# Patient Record
Sex: FEMALE | Race: WHITE | NOT HISPANIC OR LATINO | Employment: STUDENT | URBAN - METROPOLITAN AREA
[De-identification: names, ages, dates, MRNs, and addresses within clinical notes are randomized per-mention and may not be internally consistent; named-entity substitution may affect disease eponyms.]

---

## 2023-11-21 ENCOUNTER — OFFICE VISIT (OUTPATIENT)
Dept: PRIMARY CARE | Facility: CLINIC | Age: 18
End: 2023-11-21
Payer: COMMERCIAL

## 2023-11-21 VITALS
SYSTOLIC BLOOD PRESSURE: 122 MMHG | TEMPERATURE: 98 F | OXYGEN SATURATION: 98 % | BODY MASS INDEX: 36.88 KG/M2 | WEIGHT: 216 LBS | DIASTOLIC BLOOD PRESSURE: 82 MMHG | HEIGHT: 64 IN | HEART RATE: 101 BPM

## 2023-11-21 DIAGNOSIS — K64.9 ACUTE HEMORRHOID: ICD-10-CM

## 2023-11-21 DIAGNOSIS — R11.14 BILIOUS VOMITING WITH NAUSEA: Primary | ICD-10-CM

## 2023-11-21 DIAGNOSIS — E66.9 OBESITY (BMI 30-39.9): ICD-10-CM

## 2023-11-21 DIAGNOSIS — R11.10 CHRONIC VOMITING: ICD-10-CM

## 2023-11-21 PROCEDURE — 99215 OFFICE O/P EST HI 40 MIN: CPT | Performed by: FAMILY MEDICINE

## 2023-11-21 PROCEDURE — 1036F TOBACCO NON-USER: CPT | Performed by: FAMILY MEDICINE

## 2023-11-21 RX ORDER — SUMATRIPTAN SUCCINATE 100 MG/1
100 TABLET ORAL AS NEEDED
COMMUNITY
Start: 2022-12-13 | End: 2023-11-21 | Stop reason: WASHOUT

## 2023-11-21 RX ORDER — METFORMIN HYDROCHLORIDE 500 MG/1
1 TABLET ORAL
COMMUNITY
Start: 2023-03-30 | End: 2023-11-21 | Stop reason: WASHOUT

## 2023-11-21 RX ORDER — ARIPIPRAZOLE 5 MG/1
5 TABLET ORAL DAILY
COMMUNITY
End: 2023-11-21 | Stop reason: WASHOUT

## 2023-11-21 RX ORDER — SERTRALINE HYDROCHLORIDE 100 MG/1
100 TABLET, FILM COATED ORAL 2 TIMES DAILY
COMMUNITY
End: 2023-11-21 | Stop reason: WASHOUT

## 2023-11-21 RX ORDER — PROPRANOLOL HYDROCHLORIDE 10 MG/1
TABLET ORAL
COMMUNITY
Start: 2023-03-30 | End: 2023-11-21 | Stop reason: WASHOUT

## 2023-11-21 RX ORDER — METOCLOPRAMIDE 5 MG/1
5 TABLET ORAL 3 TIMES DAILY
Qty: 90 TABLET | Refills: 0 | Status: SHIPPED | OUTPATIENT
Start: 2023-11-21 | End: 2023-12-04 | Stop reason: WASHOUT

## 2023-11-21 RX ORDER — ONDANSETRON HYDROCHLORIDE 8 MG/1
8 TABLET, FILM COATED ORAL DAILY PRN
COMMUNITY
End: 2023-11-21 | Stop reason: WASHOUT

## 2023-11-21 ASSESSMENT — PATIENT HEALTH QUESTIONNAIRE - PHQ9
2. FEELING DOWN, DEPRESSED OR HOPELESS: SEVERAL DAYS
SUM OF ALL RESPONSES TO PHQ9 QUESTIONS 1 AND 2: 2
1. LITTLE INTEREST OR PLEASURE IN DOING THINGS: SEVERAL DAYS
10. IF YOU CHECKED OFF ANY PROBLEMS, HOW DIFFICULT HAVE THESE PROBLEMS MADE IT FOR YOU TO DO YOUR WORK, TAKE CARE OF THINGS AT HOME, OR GET ALONG WITH OTHER PEOPLE: SOMEWHAT DIFFICULT

## 2023-11-21 ASSESSMENT — PAIN SCALES - GENERAL: PAINLEVEL: 0-NO PAIN

## 2023-11-21 NOTE — PROGRESS NOTES
"Patient is here with mom to address some nausea and vomiting.  They have been frustrated with a lot of their care and the GI doctor that they saw simply brushed it off as having anxiety.  Patient even before having anxiety depression seen his psychiatrist was having some occasional vomiting as it was.  Patient for the last 4 months has been following up every other week with a counselor as well as also following up with her psychiatrist.  Last 4 months she is actually come off all of her psychiatric medications because they were not helping her at all and she still \"wanted to kill herself \"patient also does become very tearful during the examination but is consolable.  Patient is using medical marijuana daily and has not noticed any change in her nausea and vomiting though she states that before the medical marijuana she did actually still have some occasional nauseousness.  She has no actual abdominal pain or discomfort.  Her bowels are normal though sometimes she will be constipated.  Patient also sometimes will have some mild burning after eating spicy food but does not have nausea and vomiting in relationship specifically to food.  She also had to take a semester off because she was vomiting 3 or 4 times during class and also had to step away from her job because of it as well.  Is been very stressful for the patient as well as her mom is trying to help her.    REVIEW OF SYSTEMS: 12 systems negative except for those mentioned in the HPI.    PHYSICAL EXAMINATION:   Constitutional: The patient is alert, in no acute  distress.  Eyes: Extraocular movements are intact. Pupils are equal and reactive to light  ENT: TMs are clear  Neck: Supple without lymphadenopathy or JVD.  Heart: Regular rate and rhythm without murmur, click, gallop, or rub.  Lungs: Clear to auscultation bilaterally. No rales, rhonchi, or  wheezing.  Psychiatric: Good judgment and insight.  Patient does become tearful and somewhat avoidant however we " are able to get through this and patient does calm down and has excellent judgment and insight as well as good communication ability to share her thoughts  Abdomen: Soft nontender nondistended normoactive bowel sounds no palpable masses  Lymphatic: as noted above.  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  She has no other overt signs or symptoms of anything to be concerning with an ulcer we will go ahead and check her for H. pylori rule out.  Also discussed she has no other signs and symptoms or consistency associated with food which would be consistent with acid reflux discussed that the opioid receptor has a huge play.  The patient actually had denied using the edibles and states that she only smokes however when looking at her OARRS report she got over 220 edibles that were dispensed on the seventh of this month.  Patient will have some Reglan to see if this would help overcome her symptoms and I did refer her to a different GI at this point she does need a scope to rule that out.  They are also understandably very stressed because regular medicine has not necessarily helped with her anxiety depression and this has been very frustrating for the patient because the marijuana actually seems to help calm her down however I discussed with the patient that obviously even with the marijuana she is not having any quality of life since she has not actually able to be able to work and or even go to school.  I will also consider a [employer]or[employer] test for specific metabolic analysis to help further choose more specific medications and can also discuss this with her psychiatrist.  I discussed with her going off marijuana if she chooses to do that it would still take 2 to 3 weeks to actually have effect on her bowels.  We will go ahead  and run some blood work start her on Reglan refer back to the psychiatrist as well as also to a different GI doctor and consider scope as well as also gastric emptying test however that could be skewed simply by the fact that she is taking a massive amount of marijuana edibles.  Patient also needed records of immunizations for school and we do have some of those left go back through the chart.  Substantial amount of time counseling the patient discussing these things we will follow-up in 2 to 3 weeks.  Patient with multi complexIssues especially considering that she is nonsuicidal on the marijuana and has not really had other relief with the other medication but not sure if she was taking both of those during that timeframe as she did start those medications back in July.  Patient also has neuropsychiatry notes history of binge eating with nausea vomiting.  Also could consider Adipex usage to help with medical weight loss as well as the mood but would likely need to come off the edible Gummies to be able to do so she could still continue to smoke  This dictation was created using Dragon dictation and may contain errors

## 2023-11-21 NOTE — PROGRESS NOTES
Vomiting , Fatigue - x 6 months   Vomiting about 4x per week now   Has seen GI - they dismissed her and said it was prob anxiety

## 2023-11-22 ENCOUNTER — LAB (OUTPATIENT)
Dept: LAB | Facility: LAB | Age: 18
End: 2023-11-22
Payer: COMMERCIAL

## 2023-11-22 DIAGNOSIS — R11.14 BILIOUS VOMITING WITH NAUSEA: ICD-10-CM

## 2023-11-22 LAB
GLIADIN PEPTIDE IGA SER IA-ACNC: <1 U/ML
GLIADIN PEPTIDE IGG SER IA-ACNC: <1 U/ML
TTG IGA SER IA-ACNC: <1 U/ML
TTG IGG SER IA-ACNC: <1 U/ML

## 2023-11-22 PROCEDURE — 86258 DGP ANTIBODY EACH IG CLASS: CPT

## 2023-11-22 PROCEDURE — 83013 H PYLORI (C-13) BREATH: CPT

## 2023-11-22 PROCEDURE — 86003 ALLG SPEC IGE CRUDE XTRC EA: CPT

## 2023-11-22 PROCEDURE — 36415 COLL VENOUS BLD VENIPUNCTURE: CPT

## 2023-11-22 PROCEDURE — 86364 TISS TRNSGLTMNASE EA IG CLAS: CPT

## 2023-11-23 LAB — UREA BREATH TEST QL: NEGATIVE

## 2023-11-24 LAB
CLAM IGE QN: <0.1 KU/L
CODFISH IGE QN: <0.1 KU/L
CORN IGE QN: <0.1
EGG WHITE IGE QN: <0.1 KU/L
MILK IGE QN: <0.1 KU/L
PEANUT IGE QN: <0.1 KU/L
SCALLOP IGE QN: <0.1 KU/L
SESAME SEED IGE QN: <0.1 KU/L
SHRIMP IGE QN: <0.1 KU/L
SOYBEAN IGE QN: <0.1 KU/L
WALNUT IGE QN: <0.1 KU/L
WHEAT IGE QN: <0.1 KU/L

## 2023-11-28 ENCOUNTER — TELEPHONE (OUTPATIENT)
Dept: PRIMARY CARE | Facility: CLINIC | Age: 18
End: 2023-11-28
Payer: COMMERCIAL

## 2023-11-28 NOTE — TELEPHONE ENCOUNTER
----- Message from Zeb Rosales DO sent at 11/27/2023  8:20 AM EST -----  Patient's testing for H. pylori, celiac, and food allergy panel was all completely negative.  This might relate to a more functional bowel issue such as needing prebiotic and probiotic.

## 2023-12-04 ENCOUNTER — APPOINTMENT (OUTPATIENT)
Dept: PRIMARY CARE | Facility: CLINIC | Age: 18
End: 2023-12-04
Payer: COMMERCIAL

## 2023-12-04 ENCOUNTER — OFFICE VISIT (OUTPATIENT)
Dept: PRIMARY CARE | Facility: CLINIC | Age: 18
End: 2023-12-04
Payer: COMMERCIAL

## 2023-12-04 VITALS
SYSTOLIC BLOOD PRESSURE: 154 MMHG | HEART RATE: 97 BPM | BODY MASS INDEX: 36.88 KG/M2 | DIASTOLIC BLOOD PRESSURE: 90 MMHG | OXYGEN SATURATION: 95 % | HEIGHT: 64 IN | WEIGHT: 216 LBS | TEMPERATURE: 98 F

## 2023-12-04 DIAGNOSIS — R11.10 CHRONIC VOMITING: Primary | ICD-10-CM

## 2023-12-04 DIAGNOSIS — R11.14 BILIOUS VOMITING WITH NAUSEA: ICD-10-CM

## 2023-12-04 DIAGNOSIS — E66.9 OBESITY (BMI 30-39.9): ICD-10-CM

## 2023-12-04 PROCEDURE — 1036F TOBACCO NON-USER: CPT | Performed by: FAMILY MEDICINE

## 2023-12-04 PROCEDURE — 99214 OFFICE O/P EST MOD 30 MIN: CPT | Performed by: FAMILY MEDICINE

## 2023-12-04 RX ORDER — PANTOPRAZOLE SODIUM 40 MG/1
40 TABLET, DELAYED RELEASE ORAL DAILY
Qty: 90 TABLET | Refills: 1 | Status: SHIPPED | OUTPATIENT
Start: 2023-12-04 | End: 2024-06-01

## 2023-12-04 RX ORDER — SUCRALFATE 1 G/1
1 TABLET ORAL
Qty: 40 TABLET | Refills: 0 | Status: SHIPPED | OUTPATIENT
Start: 2023-12-04 | End: 2023-12-14

## 2023-12-04 ASSESSMENT — PATIENT HEALTH QUESTIONNAIRE - PHQ9
SUM OF ALL RESPONSES TO PHQ9 QUESTIONS 1 AND 2: 0
2. FEELING DOWN, DEPRESSED OR HOPELESS: NOT AT ALL
1. LITTLE INTEREST OR PLEASURE IN DOING THINGS: NOT AT ALL

## 2023-12-04 ASSESSMENT — PAIN SCALES - GENERAL: PAINLEVEL: 0-NO PAIN

## 2023-12-04 NOTE — PROGRESS NOTES
Patient is here with her mom for 3-week follow-up.  She did take the Reglan.  And only had about 3 times per week that she was vomiting but she still felt like she was still having substantial amount of nauseousness.  She would like referral to GI with vomiting specialty the [facility].  They can get in with that person sometime in January.  Mom is here and also has some questions as well.  We did also review the allergy panel as well as celiac.  Patient mainly does not use the marijuana except for towards the evening time.    REVIEW OF SYSTEMS: 12 systems negative except for those mentioned in the HPI.    PHYSICAL EXAMINATION:   Constitutional: The patient is alert, in no acute  distress.  Eyes: Extraocular movements are intact.   ENT: external ear canals patent  Neck: no  JVD.  Heart: no JVD  Lungs: Normal respiration without stridor or nasal flaring   Psychiatric: Good judgment and insight. Normal affect and mood.  Skin: no rashes or lesions  Abdomen: Soft nontender nondistended normal active bowel sounds no palpable masses      Assessment:  per EMR    Plan:  A long discussion with the patient and mom as I still believe and did review patient's blood work that this is likely made worse he substantially with the patient's marijuana use especially the Gummies and going off.  I did discuss the patient if she wants to use this medication to treat his medication take it regularly and also discussed possibly a low-dose in the morning or even 3 times a day so does not wear off and then because more of a cyclic vomiting pattern.  She will discuss this with her marijuana doctor also will go ahead and refer the patient to specialist she requests.  I am also discussed this could also simply be acid reflux and put her on Carafate and Protonix for which at the end the patient states that she really has no plan on doing that wants to stay natural.  I did also discussed with her prebiotic and probiotic gave her some  recommendations for this to try simply gut health.  She should also my professional opinion still have a scope for which she did not have and she may hopefully have a scope with the other GI doctor that she is going to see.  They can follow back up with me if they would like after 2 weeks of the prebiotic probiotic, 2 weeks of trying the adjustments of marijuana, and 2 weeks of the medications I sent in at that time would likely be seeing the specialist    This dictation was created using Dragon dictation and may contain errors

## 2024-01-26 ENCOUNTER — TELEMEDICINE (OUTPATIENT)
Dept: BEHAVIORAL HEALTH | Facility: CLINIC | Age: 19
End: 2024-01-26
Payer: COMMERCIAL

## 2024-01-26 DIAGNOSIS — F41.1 GAD (GENERALIZED ANXIETY DISORDER): ICD-10-CM

## 2024-01-26 DIAGNOSIS — F33.1 MODERATE EPISODE OF RECURRENT MAJOR DEPRESSIVE DISORDER (MULTI): ICD-10-CM

## 2024-01-26 PROCEDURE — 90791 PSYCH DIAGNOSTIC EVALUATION: CPT | Mod: 95 | Performed by: COUNSELOR
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
## 2024-02-01 ENCOUNTER — TELEMEDICINE (OUTPATIENT)
Dept: BEHAVIORAL HEALTH | Facility: CLINIC | Age: 19
End: 2024-02-01
Payer: COMMERCIAL

## 2024-02-01 DIAGNOSIS — F33.1 MODERATE EPISODE OF RECURRENT MAJOR DEPRESSIVE DISORDER (MULTI): ICD-10-CM

## 2024-02-01 DIAGNOSIS — F41.1 GAD (GENERALIZED ANXIETY DISORDER): ICD-10-CM

## 2024-02-01 PROCEDURE — 90837 PSYTX W PT 60 MINUTES: CPT | Mod: 95 | Performed by: COUNSELOR

## 2024-02-01 NOTE — PROGRESS NOTES
"Start and Stop Time: 1230-130  Treatment Modalities: compassionate inquiry, sensation focused, psychoeducation, window of tolerance, coping skills, solution focused   Frequency of Treatment: 1x weekly   Assessments/results: NA/ pt. Was able to complete list of \"topics that are overwhelming\" and sent to writer    SUMMARY:  discussion of school, anxiety, and best/worse/likely scnarios. CBT focused. Time spent discussing pt. Overwhlem and fear. Time spent identifying sensations associated with emotion. Time spent discussing coping skills to return to window of tolerance as well as what keeps her in the window.    Functional Status: pt. Is able to complete daily tasks, reports stable mood, currently sick but able to otherwise attend classes and work   Treatment Plan: Follow up one week. Practice behaviors to strengthen window of tolerance. Send writer list. Maintain med compliance. Practice distraction as needed. Practice sensation practice  Symptoms: low mood, fluctuation in mood stability, anxiety, fear, worry, inability to control worry   Prognosis: recurrent  Progress: pt. Progress made     Meghan Obregon [facility]     " Attending Only

## 2024-02-09 ENCOUNTER — TELEMEDICINE (OUTPATIENT)
Dept: BEHAVIORAL HEALTH | Facility: CLINIC | Age: 19
End: 2024-02-09
Payer: COMMERCIAL

## 2024-02-09 DIAGNOSIS — F33.2 SEVERE EPISODE OF RECURRENT MAJOR DEPRESSIVE DISORDER, WITHOUT PSYCHOTIC FEATURES (MULTI): ICD-10-CM

## 2024-02-09 PROCEDURE — 90837 PSYTX W PT 60 MINUTES: CPT | Mod: 95 | Performed by: COUNSELOR

## 2024-02-09 NOTE — PROGRESS NOTES
Start and Stop Time: 11-12pm   Treatment Modalities: CBT, communication styles, locus of control, emotion identification, safety planning, solution focused, DBT distress tolerance   Frequency of Treatment: 1x weekly   Assessments/results: CSSRS administered; patient reports intrusive thoughts but denies intent, method. Safety plan generated     SUMMARY:  Recent relationship stress, school stress, upcoming/pending move/campus/school change,safety plan discussed and sent out, time spent discussing distress tolerance     Functional Status: oriented x 3,   Treatment Plan: Follow up in one week, utilize distress skills, utilize safety plan, future orietntation   Symptoms: low mood, hopelessness, passive SI   Prognosis: recurrent   Progress: patient able to utilize safety/support

## 2024-02-16 ENCOUNTER — TELEMEDICINE (OUTPATIENT)
Dept: BEHAVIORAL HEALTH | Facility: CLINIC | Age: 19
End: 2024-02-16
Payer: COMMERCIAL

## 2024-02-16 DIAGNOSIS — F33.2 SEVERE EPISODE OF RECURRENT MAJOR DEPRESSIVE DISORDER, WITHOUT PSYCHOTIC FEATURES (MULTI): ICD-10-CM

## 2024-02-16 DIAGNOSIS — F41.1 GAD (GENERALIZED ANXIETY DISORDER): ICD-10-CM

## 2024-02-16 PROCEDURE — 90834 PSYTX W PT 45 MINUTES: CPT | Mod: 95 | Performed by: COUNSELOR

## 2024-02-16 NOTE — PROGRESS NOTES
Start and Stop Time: 11-11:45  Treatment Modalities: DBT review, psychoeducation, CBT/pros cons, communication  Frequency of Treatment:1xevery 1-2 week  MEDICAL: recent GI appt, future tests ordered, future dentist appt for fillings  Summary: Time spent discussing recent medical appts and outcomes, reviewing emotions from previous visit and checking on progress ( self harm decrease, ideation decrease) - denies SH since last week. Pt able to identify coping strategies ( support, getting outside, work), utilized DBT distraction, contribution this week to manage big feelings. Discussed upcoming birthday and set a goal to get to class this week ( used pros cons to gain insight)  Functional Status: Patient able to function in daily activities, does endorse utilizing marijuana ( medical card) as prescribed for some motivation /decrease in anxiety to get to work   Treatment Plan: Utilize pros/cons to get to classes this week, continue med compliance, maintain awareness of marijuana use . Follow up in 2 weeks   Symptoms: low mood, tearfulness, lethargy, GI issues   Prognosis: recurrent   Progress: patient reports decrease in intensive symtpoms      Meghan Obregon [facility]

## 2024-03-01 ENCOUNTER — TELEMEDICINE (OUTPATIENT)
Dept: BEHAVIORAL HEALTH | Facility: CLINIC | Age: 19
End: 2024-03-01
Payer: COMMERCIAL

## 2024-03-01 DIAGNOSIS — F33.3 SEVERE EPISODE OF RECURRENT MAJOR DEPRESSIVE DISORDER, WITH PSYCHOTIC FEATURES (MULTI): ICD-10-CM

## 2024-03-01 PROCEDURE — 90837 PSYTX W PT 60 MINUTES: CPT | Mod: 95 | Performed by: COUNSELOR

## 2024-03-01 NOTE — PROGRESS NOTES
Start and Stop Time: 11am-12pm  Treatment Modalities: psychoeducation, review of medication, grounding, resources, person centered  Frequency of Treatment: 1x every two weeks   Assessments/results:NA    SUMMARY: discussion of car trouble and its impact on progress of getting to class daily. Patient shared ongoing fatigue. Patient set goal to get to class M, W, F. Denies self harm. SI passive and no change/no increase. Patient endorses spacey-ness. Time spent increasing understanding of symptom; possible halluciations discussed. Patient endorses visual hallucinations at night. Patient endorses auditory hallucinations during day. Patient reports them to be more present when depressed. Discussed of grounding/CBT patient was encouraged to discuss with med management provider.     Functional Status: oriented, functioning daily tasks; however, some difficulty in maintaining school participation/class   Treatment Plan: follow up in 1-2 weeks. Continue med mgmt. Discuss symptoms with med provider. Utilize grounding as means of management   Symptoms: fatigue, some mood lability, tearfulness, passive SI, patient reports hallucinations - auditory and visual   Prognosis: recurrent  Progress: patient shared new symptoms; refining dx.

## 2024-03-15 ENCOUNTER — APPOINTMENT (OUTPATIENT)
Dept: BEHAVIORAL HEALTH | Facility: CLINIC | Age: 19
End: 2024-03-15
Payer: COMMERCIAL

## 2024-03-25 ENCOUNTER — TELEMEDICINE (OUTPATIENT)
Dept: BEHAVIORAL HEALTH | Facility: CLINIC | Age: 19
End: 2024-03-25
Payer: COMMERCIAL

## 2024-03-25 DIAGNOSIS — F33.2 SEVERE EPISODE OF RECURRENT MAJOR DEPRESSIVE DISORDER, WITHOUT PSYCHOTIC FEATURES (MULTI): ICD-10-CM

## 2024-03-25 PROCEDURE — 90837 PSYTX W PT 60 MINUTES: CPT | Mod: 95 | Performed by: COUNSELOR

## 2024-03-25 NOTE — PROGRESS NOTES
Start and Stop Time: 11-12  Treatment Modalities: level of care discussion, review of correspondence, DBT skills, safety plan  Frequency of Treatment: 1-2x monthly due to writer's frequency/schedule. Patient has been encouraged to see a different provider more frequently for symptom acuity; patient has not wanted to change providers.   Assessments/results: NA    SUMMARY: Patient explained recent hospital visit ( was not admitted but went to [facility] for assessment)and worsening symptoms. Patient educated on IOP programs and programs encouraged. Patient would like to move forward to IOP intake. Writer will reach out to get patient scheduled. Patient reports having a really hard time with all the change- school, moving, provider change. Patient and provider discussed options for care and continued care.   Functional Status: pt. Daily tasks and ability complete daily tasks is compromised.  Treatment Plan: IOP intake, update med provider, use safety plan as needed  Symptoms: worsening symptoms of depression, fatigue, self-harm/intrusive thoughts  Prognosis:recurrent  Progress: patient was able to keep self safe, utilized supports, and went to hospital. Pt. Was not admitted.     Meghan Obregon [facility]

## 2024-03-28 ENCOUNTER — TELEMEDICINE (OUTPATIENT)
Dept: BEHAVIORAL HEALTH | Facility: CLINIC | Age: 19
End: 2024-03-28
Payer: COMMERCIAL

## 2024-03-28 DIAGNOSIS — F33.1 MODERATE EPISODE OF RECURRENT MAJOR DEPRESSIVE DISORDER (MULTI): ICD-10-CM

## 2024-03-28 DIAGNOSIS — F41.9 ANXIETY: ICD-10-CM

## 2024-03-28 DIAGNOSIS — F43.10 PTSD (POST-TRAUMATIC STRESS DISORDER): ICD-10-CM

## 2024-03-28 PROCEDURE — 90791 PSYCH DIAGNOSTIC EVALUATION: CPT | Mod: U2,95

## 2024-03-28 NOTE — PROGRESS NOTES
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 Documentation   Risk Level :  [ ] High Suicide Risk  [ ] Moderate Suicide Risk  [ ] Low Suicide Risk   Clinical Note:    Your Clinical Observation  Relevant Mental Status Information  Methods of Suicide Risk Evaluation    Brief Evaluation Summary  Warning Signs  Risk Indicators  Protective Factors  Access to Lethal Means  Collateral Sources Used and Relevant Information Obtained  Specific Assessment Data to Support Risk Determination  Rationale for Actions Taken and Not Taken    Provision of Crisis Line [phone]-TALK([phone])  Implementation of Safety Plan (If Applicable)

## 2024-03-29 ENCOUNTER — APPOINTMENT (OUTPATIENT)
Dept: BEHAVIORAL HEALTH | Facility: CLINIC | Age: 19
End: 2024-03-29
Payer: COMMERCIAL

## 2024-04-01 ENCOUNTER — TELEMEDICINE (OUTPATIENT)
Dept: BEHAVIORAL HEALTH | Facility: CLINIC | Age: 19
End: 2024-04-01
Payer: COMMERCIAL

## 2024-04-01 ENCOUNTER — CLINICAL SUPPORT (OUTPATIENT)
Dept: BEHAVIORAL HEALTH | Facility: CLINIC | Age: 19
End: 2024-04-01
Payer: COMMERCIAL

## 2024-04-01 DIAGNOSIS — F43.10 PTSD (POST-TRAUMATIC STRESS DISORDER): ICD-10-CM

## 2024-04-01 DIAGNOSIS — F43.10 POSTTRAUMATIC STRESS DISORDER: ICD-10-CM

## 2024-04-01 DIAGNOSIS — F41.1 GENERALIZED ANXIETY DISORDER: ICD-10-CM

## 2024-04-01 DIAGNOSIS — F33.3 SEVERE EPISODE OF RECURRENT MAJOR DEPRESSIVE DISORDER, WITH PSYCHOTIC FEATURES (MULTI): ICD-10-CM

## 2024-04-01 DIAGNOSIS — F33.1 MAJOR DEPRESSIVE DISORDER, RECURRENT EPISODE, MODERATE (MULTI): ICD-10-CM

## 2024-04-01 DIAGNOSIS — Z79.899 MEDICAL CANNABIS USE: ICD-10-CM

## 2024-04-01 PROCEDURE — 99215 OFFICE O/P EST HI 40 MIN: CPT | Performed by: CLINICAL NURSE SPECIALIST

## 2024-04-01 PROCEDURE — 90853 GROUP PSYCHOTHERAPY: CPT | Mod: U2,95
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
## 2024-04-01 NOTE — GROUP NOTE
Group Topic: Coping Skills   Group Date: 4/1/2024  Start Time: 10:00 AM  End Time: 11:00 AM  Facilitators: MATTHIAS Worley   Department: [facility]    This was a video IOP group on a HIPAA compliant platform. All patients were provided with informed consent.    Date: 4/1/2024  Time: 10:00-11:00 am group  Number of Patients Present: 9  User Name: MATTHIAS Lopez, ATR  Number of Staff: 2    Group Topic(s): Check-ins/Meditation/ Opposite Action Psychoeducation     Pt. first engaged in a check-in and responded to an ice breaker question. Next, pt. was introduced to and engaged in a mindfulness meditation. Lastly, pt. received psychoeducation on opposite action.     Pt. was present, attentive and fully engaged in group discussion.    Facilitator: MATTHIAS Cárdenas, ATR  Secondary Facilitator: JONATAN Lawrence  Supervised by MATTHIAS Ramesh-S, ATR    Name: Carlie WILKINSON Dean YOB: 2005   MR: [id number]

## 2024-04-01 NOTE — GROUP NOTE
Group Topic: Goals   Group Date: 4/1/2024  Start Time:  9:00 AM  End Time: 10:00 AM  Facilitators: MATTHIAS Apodaca   Department: [facility]    Number of Participants: 10   Group Focus: check in and goals  Treatment Modality: Cognitive Behavioral Therapy  Interventions utilized were assignment, reminiscence, and support  Purpose: coping skills, feelings, and insight or knowledge    This was a video IOP group on a HIPAA compliant platform. All patients were provided with informed consent.     Date: 04/01/24, Time: 9:00-10:00 am group, Number of Patients Present: 10, User Name: [email or web address], Number of Staff: 2     Group topic(s): Weekend check-ins     On Thursday, pts. were presented with and completed the SMART goals worksheet, which included goals in the following categories: achievement, closeness to others, and enjoyment, along with anticipated obstacles and coping strategies they can utilize. Today, pts. checked in, shared about their weekends, and set intentions for the week.    Facilitator: JONATAN Lawrence  Supervised by MATTHIAS Ramesh-S, ATR  Secondary Facilitator: MATTHIAS Marion    Name: Carlie Mobley YOB: 2005   MR: [id number]    Pt. set an intention to try to be productive and social while being patient and kind to herself throughout the week.    Pt. was present, attentive, and fully engaged during group discussion.

## 2024-04-01 NOTE — GROUP NOTE
Group Topic: Coping Skills   Group Date: 4/1/2024  Start Time: 11:00 AM  End Time: 12:00 PM  Facilitators: MATTHIAS Worley   Department: [facility]    This was a video IOP group on a HIPAA compliant platform. All patients were provided with informed consent.     Date: 4/1/2024  Time: 11:00 am-12:00 pm group  Number of Patients Present: 10  User Name: MATTHIAS Lopez, ATR  Number of Staff: 2    Group Topic(s): Opposite action continued     Pts identified 3 emotions they have been experiencing lately, along with the urges that typically arise when they feel these emotions and opposite actions/feelings associated with the urges. Pts collectively discussed how opposite action skill can help them to work through issues, confront situations, deal with distressing emotions, and engage in activities that they might otherwise avoid. Lastly, pts watched a 6-minute video on the 5-4-3-2-1 technique on motivation by Rhoda Eugene.     Name: Carlie Mobley YOB: 2005   MR: [id number]      Carlie spoke about how she often experiences feelings of shame and guilt. Carlie shared that she tends to stay silent and isolate herself; and shared opposite actions including reaching out to friends and engaging in self-compassion activities.     Pt. was present, attentive, and fully engaged in group discussion.    Facilitator: MATTHIAS Cárdenas, ATR  Secondary Facilitator: JONATAN Lawrence  Supervised by MATTHIAS Ramesh-S, ATR

## 2024-04-02 ENCOUNTER — CLINICAL SUPPORT (OUTPATIENT)
Dept: BEHAVIORAL HEALTH | Facility: CLINIC | Age: 19
End: 2024-04-02
Payer: COMMERCIAL

## 2024-04-02 DIAGNOSIS — F41.1 GENERALIZED ANXIETY DISORDER: ICD-10-CM

## 2024-04-02 DIAGNOSIS — F43.10 POSTTRAUMATIC STRESS DISORDER: ICD-10-CM

## 2024-04-02 DIAGNOSIS — F33.1 MAJOR DEPRESSIVE DISORDER, RECURRENT EPISODE, MODERATE (MULTI): ICD-10-CM

## 2024-04-02 PROCEDURE — 90853 GROUP PSYCHOTHERAPY: CPT | Mod: U2,95

## 2024-04-02 NOTE — GROUP NOTE
Group Topic: Feeling Awareness/Expression   Group Date: 4/2/2024  Start Time: 10:00 AM  End Time: 11:00 AM  Facilitators: MATTHIAS Apodaca   Department: [facility]    Number of Participants: 11   Group Focus: feeling awareness/expression  Treatment Modality: Cognitive Behavioral Therapy and Psychoeducation  Interventions utilized were group exercise and patient education  Purpose: maladaptive thinking, feelings, irrational fears, and insight or knowledge    Topic: Cognitive Model- CBT triangle    Group learned about the cognitive behavior triangle. Group was provided a few examples of how thoughts impact emotions and behavior. Then group wrote their own CBT triangle example. Time was allotted for questions and clarifying content. Group also learned about the difference between an emotion and interpretation ( ie. Sad vs. Helpless).     Facilitated by Meghan [facility]regon [facility] Sarika Mcmanus CT   Supervised by MATTHIAS Ramesh-S, ATR    Name: Carlie Mobley YOB: 2005   MR: [id number]    Carlie was on camera. She did not share.

## 2024-04-02 NOTE — GROUP NOTE
Group Topic: Feeling Awareness/Expression   Group Date: 4/2/2024  Start Time:  9:00 AM  End Time: 10:00 AM  Facilitators: MATTHIAS Worley   Department: [facility]    This was a video IOP group on a HIPAA compliant platform. All patients were provided with informed consent.       Date: 4/2/2024  Time: 9:00-10:00 am   Number of Patients Present: 11  User Name: MATTHIAS Lopez, ATR  Number of Staff: 2      Group Topic(s): Feelings check-ins/Psychological Safety and security       Pts first engaged in a feelings check-in and received psychoeducation on psychological safety and security. Next, pts were presented with a prompt on inner safety and security, where they were asked write about one person, one thing, and one place that makes them feel safe. Lastly, pts shared with the group and discussed the following: what ways the person, place, and thing makes them feel safe, what they need in general to feel safe, and how they can and/or do create a safe environment for themselves.     Name: Carlie Mobley YOB: 2005   MR: [id number]      Carlie shared feeling exhausted today. Carlie spoke about things that bring her a sense of inner safety including her mom, being outside, and blankets. Carlie was present, attentive, and fully engaged throughout session.      Primary Facilitator: MATTHIAS Cárdenas, ATR  Secondary Facilitator: JONATAN Lawrence  Supervised by MATTHIAS Ramesh-S, ATR

## 2024-04-03 ENCOUNTER — CLINICAL SUPPORT (OUTPATIENT)
Dept: BEHAVIORAL HEALTH | Facility: CLINIC | Age: 19
End: 2024-04-03
Payer: COMMERCIAL

## 2024-04-03 DIAGNOSIS — F41.1 GENERALIZED ANXIETY DISORDER: ICD-10-CM

## 2024-04-03 DIAGNOSIS — F33.1 MAJOR DEPRESSIVE DISORDER, RECURRENT EPISODE, MODERATE (MULTI): ICD-10-CM

## 2024-04-03 DIAGNOSIS — F43.10 POSTTRAUMATIC STRESS DISORDER: ICD-10-CM

## 2024-04-03 PROCEDURE — 90853 GROUP PSYCHOTHERAPY: CPT | Mod: U2,95

## 2024-04-04 ENCOUNTER — APPOINTMENT (OUTPATIENT)
Dept: BEHAVIORAL HEALTH | Facility: CLINIC | Age: 19
End: 2024-04-04
Payer: COMMERCIAL

## 2024-04-04 NOTE — GROUP NOTE
Group Topic: Coping Skills   Group Date: 4/3/2024  Start Time:  9:00 AM  End Time: 10:00 AM  Facilitators: MATTHIAS Worley   Department: [facility]    This was a video IOP group on a HIPAA compliant platform. All patients were provided with informed consent.      Date: 4/3/2024  Time: 9:00-10:00 am  Number of Patients Present: 10   User Name: NICOLAS Lopez, ATR  Number of Staff: 2      Group Topic(s): Feelings check-ins/Meditation/Journaling       Pt. first engaged in a feelings check-in and shared one fact about themselves. Next, pt. participated in a 5-minute guided meditation. Pt. then received psychoeducation on the benefits of journaling and were presented with a list of prompts. Lastly, pts spent 6 minutes writing independently.       Name: Carlie Mobley YOB: 2005   MR: [id number]      Carlie shared feeling thoughtful. Carlie spoke about how she uses journaling and meditation.     Pt. was present, attentive, and fully engaged in group discussion.     Primary Facilitator: MATTHIAS Cárdenas, ATR  Secondary Facilitator: JONATAN Lawrence  Supervised by Sena RIZZO-S, ATR

## 2024-04-05 ENCOUNTER — DOCUMENTATION (OUTPATIENT)
Dept: BEHAVIORAL HEALTH | Facility: CLINIC | Age: 19
End: 2024-04-05
Payer: COMMERCIAL

## 2024-04-05 NOTE — GROUP NOTE
Group Topic: Coping Skills   Group Date: 4/3/2024  Start Time: 11:00 AM  End Time: 12:00 PM  Facilitators: Cassia Canchola PsyD   Department: [facility]    Number of Participants: 9   Group Focus: acceptance  Treatment Modality: Dialectical Behavioral Therapy  Interventions utilized were group exercise  Purpose: Radical Acceptance/Distress Tolerance    Date: 4/3/2024, Time: 10a-11a Number of Patients: 9, Username: [email or web address], Number of Staff: 2    This session was conducted via HIPAA compliant video [employer] telehealth platform. Patient was provided with informed consent. Group focused on distress tolerance. Group members discussed radical acceptance. Completed a worksheet that allowed space for processing working towards acceptance of something we cannot control.     Primary Facilitator: Cassia Canchola PsyD  Secondary Facilitator - Sarika CHEUNG  Supervisor Sena Tilley [facility]-S, ATR    Name: Carlie Mobley YOB: 2005   MR: [id number]      Carlie appeared attentive but did not actively participate.

## 2024-04-05 NOTE — GROUP NOTE
Group Topic: Coping Skills   Group Date: 4/3/2024  Start Time: 10:00 AM  End Time: 11:00 AM  Facilitators: Cassia Canchola PsyD   Department: [facility]    Number of Participants: 10   Group Focus: acceptance  Treatment Modality: Dialectical Behavioral Therapy  Interventions utilized were patient education  Purpose: Radical Acceptance/Distress Tolerance    Date: 4/3/2024, Time: 10a-11a Number of Patients: 10, Username: [email or web address], Number of Staff: 2    This session was conducted via HIPAA compliant video [employer] telehealth platform. Patient was provided with informed consent. Group focused on distress tolerance. Group members discussed radical acceptance. Defined this and discussed how to work on healthier ways of thinking, accepting, and allowing space for emotions.     Primary Facilitator: Cassia Canchola PsyD  Secondary Facilitator - Sarika CHEUNG  Supervisor Sena Tilley, [facility]-S, ATR    Name: Carlie Mobley YOB: 2005   MR: [id number]      Carlie appeared attentive but did not actively participate.

## 2024-04-08 ENCOUNTER — CLINICAL SUPPORT (OUTPATIENT)
Dept: BEHAVIORAL HEALTH | Facility: CLINIC | Age: 19
End: 2024-04-08
Payer: COMMERCIAL

## 2024-04-08 DIAGNOSIS — F43.10 POSTTRAUMATIC STRESS DISORDER: ICD-10-CM

## 2024-04-08 DIAGNOSIS — F41.1 GENERALIZED ANXIETY DISORDER: ICD-10-CM

## 2024-04-08 DIAGNOSIS — F33.1 MAJOR DEPRESSIVE DISORDER, RECURRENT EPISODE, MODERATE (MULTI): ICD-10-CM

## 2024-04-08 PROCEDURE — 90853 GROUP PSYCHOTHERAPY: CPT | Mod: U2,95

## 2024-04-08 NOTE — GROUP NOTE
Group Topic: Goals   Group Date: 4/8/2024  Start Time:  9:00 AM  End Time: 10:00 AM  Facilitators: MATTHIAS Worley; Cassia Canchola PsyD   Department: [facility]        Name: Carlie Mobley YOB: 2005   MR: [id number]      This was a video IOP group on a HIPAA compliant platform. All patients were provided with informed consent.     Date: 04/08/24, Time: 9:00-10:00 am group, Number of Patients Present: 10, User Name: [email or web address], CT, Number of Staff: 2     Group topic(s): Weekend check-ins     On Thursday, pts. were presented with and completed the SMART goals worksheet, which included goals in the following categories: achievement, closeness to others, and enjoyment, along with anticipated obstacles and coping strategies they can utilize. Today, pts. checked in, shared about their weekends, and set intentions for the week.    Pt. shared that she feels reflective today. Pt. shared that she had an “alright” but exhausting weekend which she attributes to being back at work, finishing schoolwork, and starting IOP. Pt. set an intention to be more intentional with how she uses her time throughout the week.   Pt. was present, attentive, and fully engaged during group discussion.     Facilitator: JONATAN Lawrence  Supervised by Sena Tilley, MATTHIAS-S, ATR  Secondary Facilitator: Cassia Canchola MA, PsyD

## 2024-04-09 ENCOUNTER — TELEMEDICINE (OUTPATIENT)
Dept: BEHAVIORAL HEALTH | Facility: CLINIC | Age: 19
End: 2024-04-09
Payer: COMMERCIAL

## 2024-04-09 ENCOUNTER — CLINICAL SUPPORT (OUTPATIENT)
Dept: BEHAVIORAL HEALTH | Facility: CLINIC | Age: 19
End: 2024-04-09
Payer: COMMERCIAL

## 2024-04-09 DIAGNOSIS — F33.1 MAJOR DEPRESSIVE DISORDER, RECURRENT EPISODE, MODERATE (MULTI): ICD-10-CM

## 2024-04-09 DIAGNOSIS — Z79.899 MEDICAL CANNABIS USE: ICD-10-CM

## 2024-04-09 DIAGNOSIS — F33.3 SEVERE EPISODE OF RECURRENT MAJOR DEPRESSIVE DISORDER, WITH PSYCHOTIC FEATURES (MULTI): ICD-10-CM

## 2024-04-09 DIAGNOSIS — F41.1 GENERALIZED ANXIETY DISORDER: ICD-10-CM

## 2024-04-09 DIAGNOSIS — F43.10 POSTTRAUMATIC STRESS DISORDER: ICD-10-CM

## 2024-04-09 DIAGNOSIS — F43.10 PTSD (POST-TRAUMATIC STRESS DISORDER): ICD-10-CM

## 2024-04-09 PROCEDURE — 99213 OFFICE O/P EST LOW 20 MIN: CPT | Performed by: CLINICAL NURSE SPECIALIST

## 2024-04-09 PROCEDURE — 90853 GROUP PSYCHOTHERAPY: CPT | Mod: U2,95

## 2024-04-09 NOTE — PROGRESS NOTES
"Outpatient Psychiatry      Subjective   Carlie Mobley, is a 19 y.o. female seen in follow up today.       Assessment/Plan   Diagnoses:   Encounter Diagnoses   Name Primary?    Severe episode of recurrent major depressive disorder, with psychotic features (CMS/HCC)     PTSD (post-traumatic stress disorder)     Medical cannabis use        Assessment:    Tolerating recent titration of of escitalopram, but too soon to assess benefit. Anticipate further titration to target dose of 20 mg given severity of sx. Will plan for dose increase at next visit if she continues to tolerate current dose well. Will otherwise continue gabapentin PRN for anxiety.     Making progress toward IOP treatment goals. Treatment plan reviewed with IOP team weekly.     Treatment Plan/Recommendations:   Continue escitalopram 15 mg PO daily.   Continue gabapentin 100 mg PO TID PRN for anxiety.   Continue engagement in IOP for up to 4 days/week or 12 hours/week.   Follow-up in 1 week for medication management.       Reason for Visit:  IOP follow-up for medication management.     HPI:  Since her last visit,     Completed first week of IOP, and also returned to work. Felt kind of overwhelmed and drained.   Difficult to get rest because her mind races.   Struggling more with intrusive thoughts- \"a lot of bad self talk.... feels like my brain is trying convince me that everything is bad.\"  Tolerated increased in escitalopram without side effects. Continues to find gabapentin PRN for anxiety beneficial. Has been taking 2-3 times/day recently.    Continues to endorse a passive death wish. No suicidal thinking or passive death wish. No change in vague, auditory hallucinations.     Mom is a significant support, whom she can reach out to when she's struggling.     No new medications or health problems.       Current Psychiatric Medications:  Escitalopram 15 mg PO daily  Gabapentin 100 mg PO TID PRN for anxiety      Record Review: brief     Medical Review " "Of Systems:  Pertinent items are noted in HPI.    Psychiatric Review Of Systems:  As noted in HPI.        Objective   Mental Status Exam  General Appearance: Well groomed, appropriate eye contact  Attitude/Behavior: Cooperative  Motor: No psychomotor agitation or retardation, no tremor or other abnormal movements  Speech: Normal rate, volume, prosody  Gait/Station: Other:(comment) (not observed- virtual.)  Mood: \"kind of drained. \"  Affect: Dysphoric, constricted but reactive, Congruent with mood and topic of conversation  Thought Process: Linear, goal directed  Thought Associations: No loosening of associations  Thought Content: Other: (comment) (depressive themes, passive death wish, intrusive negative thoughts.)  Perception: Patient endorses hallucinations, but no evidence noted on exam  Sensorium: Alert and oriented to person, place, time and situation  Insight: Intact  Judgement: Intact  Cognition: Cognitively intact to conversational testing with respect to attention, orientation, fund of knowledge, recent and remote memory, and language      Vitals:  There were no vitals filed for this visit.        Cristina Richards, APRN-CNP, APRN-CNS   "

## 2024-04-09 NOTE — GROUP NOTE
Group Topic: Feeling Awareness/Expression   Group Date: 4/9/2024  Start Time:  9:00 AM  End Time: 10:00 AM  Facilitators: MATTHIAS Worley   Department: [facility]        Name: Carlie Mobley YOB: 2005   MR: [id number]        This was a video IOP group on a HIPAA compliant platform. All patients were provided with informed consent.     Date: 04/09/24, Time: 9:00-10:00 am group, Number of Patients Present: 9, User Name: [email or web address], CT, Number of Staff: 2     Group topic(s): Personal Wellness Plan     Pts. checked in about their mood, and were introduced to a wellness plan template, which they utilized to assess themselves based on several areas of wellness. They identified their strengths, areas for improvement, and goals for each area. Pts. then shared their thoughts about the activity.      Pt. shared that she feels frazzled today - she had a “rough night”/couldn't sleep and woke up late today.   Pt. was present, attentive, and fully engaged in group discussion.     Facilitator: JONATAN Lawrence  Supervised by MATTHIAS Ramesh-S, ATR  Secondary Facilitator: MATTHIAS Cárdenas, ATR

## 2024-04-10 ENCOUNTER — APPOINTMENT (OUTPATIENT)
Dept: BEHAVIORAL HEALTH | Facility: CLINIC | Age: 19
End: 2024-04-10
Payer: COMMERCIAL

## 2024-04-10 NOTE — PROGRESS NOTES
[place]   INTENSIVE OUTPATIENT PROGRAM MULTIDISCIPLINARY  TREATMENT PLAN & PROGRESS NOTE     Patient: Carlie Mobley      : 2005  Age:  19    Student: YES  Treatment Team Date: 2024       MRN# [id number]  Nurse Practitioner: Cristina Richards NP  Date of IOP Admission: 2024     Ref by: Meghan Orbegon              Week  1                      Admission Scores: ERIC 21: 37 DELIA: 34 BDI: 44  PHQ-9: 24 MAST: 0 DAST: 2 MDQ: 5     Current Risk Assessment:  Suicidal Risk:      None:  X  Ideation:       Plan:      Intent:      SI Plan with plan contracts for safety:     Hx of harming self:        Homicidal Risk:  None:  X Ideation:       Plan:      Intent:      HI Plan with means:                                     Hx of harming others:          Global Improvement    Clinical Global Impressions Rating Scale Of Illness:  6  1-No Illness Present   2-Minimal   3-Mild   4-Moderate   5-Marked   6-Severe  X 7-Very Severe     Diagnoses & ICD-10 Codes  Primary Diagnosis & Code: Major Depressive Disorder; F33.1    Secondary Diagnosis & Code: Generalized Anxiety Disorder; F41.1  Tertiary Diagnosis & Code: PTSD; F43.10     Psychosocial & Environmental Stressors:   Financial  insecurity/stress   Medication  Family/Home life Difficulties   Work  School  Inadequacy of social support   Hx of trauma   Recent psychiatric inpatient discharge      Patient's Treatment Goals:            Date Initiated:            Progress Update:  2024  1.  Attend and actively participate in IOP _4_ days/week for 3 hours per day   2024  Good  X   Fair    Poor     Unclear   2.  Attend weekly medication management sessions and maintain compliance of medications  2024      Good  X  Fair     Poor     Unclear                                                        2.  Identify symptoms of diagnoses and develop coping plans    2024   Good  X  Fair     Poor     Unclear                                                 3.  Increase illness  education and symptom insight                  4/1/2024  Good  X  Fair     Poor     Unclear        Current medications:  See EMR chart        Progress note:  _X_New to the IOP program, attending as planned  __Compliant, Progressing & Improving - Needs more time in IOP therapy program   __Compliant, Progressing & Improving Planning Discharge   __Compliant, Not Progressing or Improving: Reason  __Non-Compliant, not progressing or improving: Plan  __Other:     Insurance & Benefits: MEDICAL [facility], IN NETWORK, BENEFITS ARE BASED ON MEDICAL NECESSITY ONLY: Unlimited visits, covers 80 % of the contracted rate after deductible has been met.     Co-Pay per day: $0     DEDUCTIBLE        Single: 1,250.00 Family: 2,500.00Accumulation:  Single:  401.40  Family: 401.40   CO- INSURANCE  Single:  / Family:  / Accumulation:  Single:  /  Family: /    OUT OF POCKET  Single: 3,250.00 Family: 6,500.00Accumulation: Single: 401.40  Family: 401.40         Total IOP Sessions completed & authorized to date:  4    Discharge plans have been discussed with patient & NP Cristina Richards on:   Reason for discharge:    ___Successfully completed IOP treatment:   ___Pt. decided to seek treatment elsewhere:   ___Dropped out or no show more than three times:  ___Benefits exhausted:   ___Other:    Discharge date:                     Discharge Prognosis: Excellent      Good     Fair     Poor    Follow up appointment with: Physician:   Follow up appointment with: Therapist:    Follow up Aftercare group?  Yes   Patient provided with Crisis Hotline phone number for suicide prevention? Yes     Discharge Scores: Not Completed       Treatment plan electronically signed by Treatment Team:   MATTHIAS Schaffer, ATR, CAMMIE Richards NP, SClarisse Canchola, EUGENIE [facility]

## 2024-04-10 NOTE — GROUP NOTE
Group Topic: Feeling Awareness/Expression   Group Date: 4/8/2024  Start Time: 11:00 AM  End Time: 12:00 PM  Facilitators: MATTHIAS Worley   Department: [facility]    This was a video IOP group on a HIPAA compliant platform. All patients were provided with informed consent.     Date: 4/8/24  Time: 11:00 am-12:00 pm group  Number of Patients Present: 8  User Name: MATTHIAS Lopez, ATR  Number of Staff: 2    Group Topic(s): Understanding your inner bully: Self-awareness through the use of art-based media      Pt. was introduced to the art therapy intervention, understanding your inner bully/self-critic. Pt. elizabeth what they believed their inner bully to look and/or feel like. Pt. then independently answered the following questions:   1. What does your bully look like (e.g., size, shape, color, facial expression)?  2. Is it taking up a tiny portion of the page, part of the page, or most of the page?  3. Do you feel ready to confront your bully?  4. How does your bully harm you?  5. Are there benefits to having a bully?  6. When was the last time your bully took over? How did you feel?  7. How long have you had this tormentor?  8. How do you think your bully developed?  9. How do you feed it (e.g., negative thinking, fear, self-criticism)?   10.  Is there a time of day when your bully is most dominant?  11.  Is there someone or something that triggers it?  12. What is one thing you can do today to defend yourself?    Pt. spoke about how their inner self-critic often takes over and negatively impacts their mood and behaviors. Pt. shared how they believe their inner bully developed and spoke about times it is most present and dominant. Pt. was present and fully engaged throughout session.     Facilitator: MATTHIAS Cárdenas, ATR  Secondary: JONATAN Lawrence  Supervised by MATTHIAS Ramesh, ATR    Name: Carlie Mobley YOB: 2005   MR: [id number]

## 2024-04-10 NOTE — GROUP NOTE
Group Topic: Feeling Awareness/Expression   Group Date: 4/8/2024  Start Time: 10:00 AM  End Time: 11:00 AM  Facilitators: MATTHIAS Worley   Department: [facility]    This was a video IOP group on a HIPAA compliant platform. All patients were provided with informed consent.     Date: 4/8/24  Time: 10:00-11:00 am group  Number of Patients Present: 9   User Name: MATTHIAS Lopez, ATR  Number of Staff: 2    Group Topic(s): Mindfulness meditation; Self-awareness through the use of art based media     Pt. first engaged in an ice breaker question and a 10-minute mindfulness meditation. Next, pt. received psychoeducation on self-awareness.     Facilitator: MATTHIAS Cárdenas, ATR  Secondary: JONATAN Lawrence  Supervised by MATTHIAS Ramesh, ATR    Name: Carlie WILKINSON Itz YOB: 2005   MR: [id number]      Carlie was present and fully engaged throughout session.

## 2024-04-10 NOTE — GROUP NOTE
Group Topic: Feeling Awareness/Expression   Group Date: 4/9/2024  Start Time: 10:00 AM  End Time: 11:00 AM  Facilitators: Cassia Canchola PsyD   Department: [facility]    Number of Participants: 9   Group Focus: other Core Beliefs  Treatment Modality: Cognitive Behavioral Therapy  Interventions utilized were patient education  Purpose: other: Psycho-education on core beliefs    Date: 4/9/2024, Time: 10a-11a Number of Patients: 9, Username: [email or web address], Number of Staff: 2    This session was conducted with HIPPA compliant video Zoom telehealth platform. Patient was provided with consent. Group focused on core beliefs. Provided psychoeducation on types of core beliefs and how they develop. Provided examples on ways core beliefs impact our thoughts, feelings and behaviors. Identified ways to challenge negative core beliefs with opposite evidence. Group members shared beliefs they hold about themselves.    Primary Facilitator: Cassia Canchola PsyD  Secondary Facilitator - Sarika CHEUNG  Supervisor Sena Tilley, [facility], ATR    Name: Carlie Mobley YOB: 2005   MR: [id number]      Carlie requested to pass on sharing.

## 2024-04-11 ENCOUNTER — APPOINTMENT (OUTPATIENT)
Dept: BEHAVIORAL HEALTH | Facility: CLINIC | Age: 19
End: 2024-04-11
Payer: COMMERCIAL

## 2024-04-11 NOTE — GROUP NOTE
Group Topic: Feeling Awareness/Expression   Group Date: 4/9/2024  Start Time: 11:00 AM  End Time: 12:00 PM  Facilitators: Cassia Canchola PsyD   Department: [facility]    Number of Participants: 9   Group Focus: other Core Beliefs  Treatment Modality: Cognitive Behavioral Therapy  Interventions utilized were group exercise  Purpose: other: Examining evidence for core beliefs      Date: 4/9/2024, Time: 11a-12p Number of Patients: 9, Username: [email or web address], Number of Staff: 2    This session was conducted with HIPPA compliant video Zoom telehealth platform. Patient was provided with consent. Group focused on core beliefs. Identified ways to challenge negative core beliefs with opposite evidence. Group members completed an activity individually to identify evidence for the core belief, against it, and modified evidence. Also discussed considering 3 points of evidence that are opposite of our core beliefs.    Primary Facilitator: Cassia Canchola PsyD  Secondary Facilitator - Sarika CHEUNG  Supervisor Sena Tilley, [facility], ATR    Name: Carlie Mobley YOB: 2005   MR: [id number]      Carlie appeared attentive but did not actively participate.

## 2024-04-12 ENCOUNTER — DOCUMENTATION (OUTPATIENT)
Dept: BEHAVIORAL HEALTH | Facility: CLINIC | Age: 19
End: 2024-04-12
Payer: COMMERCIAL

## 2024-04-15 ENCOUNTER — CLINICAL SUPPORT (OUTPATIENT)
Dept: BEHAVIORAL HEALTH | Facility: CLINIC | Age: 19
End: 2024-04-15
Payer: COMMERCIAL

## 2024-04-15 ENCOUNTER — TELEMEDICINE (OUTPATIENT)
Dept: BEHAVIORAL HEALTH | Facility: CLINIC | Age: 19
End: 2024-04-15
Payer: COMMERCIAL

## 2024-04-15 DIAGNOSIS — Z79.899 MEDICAL CANNABIS USE: ICD-10-CM

## 2024-04-15 DIAGNOSIS — F41.1 GENERALIZED ANXIETY DISORDER: ICD-10-CM

## 2024-04-15 DIAGNOSIS — F33.3 SEVERE EPISODE OF RECURRENT MAJOR DEPRESSIVE DISORDER, WITH PSYCHOTIC FEATURES (MULTI): ICD-10-CM

## 2024-04-15 DIAGNOSIS — F43.10 POSTTRAUMATIC STRESS DISORDER: ICD-10-CM

## 2024-04-15 DIAGNOSIS — F33.1 MAJOR DEPRESSIVE DISORDER, RECURRENT EPISODE, MODERATE (MULTI): ICD-10-CM

## 2024-04-15 DIAGNOSIS — F43.10 PTSD (POST-TRAUMATIC STRESS DISORDER): ICD-10-CM

## 2024-04-15 PROCEDURE — 90853 GROUP PSYCHOTHERAPY: CPT | Mod: U2,95

## 2024-04-15 PROCEDURE — 99214 OFFICE O/P EST MOD 30 MIN: CPT | Performed by: CLINICAL NURSE SPECIALIST

## 2024-04-15 RX ORDER — ESCITALOPRAM OXALATE 20 MG/1
20 TABLET ORAL DAILY
COMMUNITY
Start: 2024-04-04 | End: 2024-04-15 | Stop reason: SDUPTHER

## 2024-04-15 RX ORDER — GABAPENTIN 100 MG/1
100 CAPSULE ORAL 3 TIMES DAILY PRN
COMMUNITY

## 2024-04-15 RX ORDER — ESCITALOPRAM OXALATE 20 MG/1
20 TABLET ORAL DAILY
Qty: 30 TABLET | Refills: 0 | Status: SHIPPED | OUTPATIENT
Start: 2024-04-15 | End: 2024-05-15

## 2024-04-15 NOTE — PROGRESS NOTES
"Outpatient Psychiatry      Subjective   Carlie Mobley, is a 19 y.o. female seen in follow up today.       Assessment/Plan   Diagnoses:   Encounter Diagnoses   Name Primary?    Severe episode of recurrent major depressive disorder, with psychotic features (Multi)     PTSD (post-traumatic stress disorder)     Medical cannabis use        Assessment:    Titrating escitalopram as recommended/planned per outpatient psychiatrist. Tolerating without side effects. Has been at 20 mg dose for ~2 weeks, so too soon to assess benefit. Advised that it would be reasonable to take up to 2 caps (200 mg) gabapentin PRN for anxiety if 100 mg dose provides only partial benefit. May need less frequently or at a lower dose as escitalopram reaches full benefit.     Making progress toward IOP treatment goals. Treatment plan reviewed with IOP team weekly.     Treatment Plan/Recommendations:   Continue escitalopram 20 mg PO daily (inc'd per instructions from outpatient provider ~2 weeks ago).  Continue gabapentin 100 mg PO TID PRN for anxiety- ok to take up to 2 caps (200 mg) in a single dose PRN if needed.  Continue engagement in IOP for up to 4 days/week or 12 hours/week.   Follow-up in 1 week for medication management.       Reason for Visit:  IOP follow-up for medication management.     HPI:  Since her last visit,     Titrated escitalopram to 20 mg per instructions from outpatient psychiatrist, and tolerating ok. \"On the med side of things I'm doing ok.\"   Last week, \"had a mental breakdown and my mom had to come and pick me up from [place].\"  Mom picked her up on Thursday and brought her back to stay at parents' house.   Missed Wednesday and Thursday groups d/t significant depressive sx.   Was having a lot of negative thoughts, felt very overwhelmed, \"everything just felt very difficult.\"   Feels anxious being away from friends and missing work, but thinks she went back to [place] sooner than she was ready to.   Finds groups " "helpful- still trying to get comfortable with participating more. Hard sometimes to share in a group, but motivated to do so.   Wondering whether it's safe to take up to 2 caps of gabapentin PRN if anxiety is particularly severe and 1 cap has limited benefit.     Denies suicidal ideation or a passive death wish.     No new medications or health problems.       Current Psychiatric Medications:  Escitalopram 20 mg PO daily  Gabapentin 100 mg PO TID PRN for anxiety      Record Review: brief     Medical Review Of Systems:  Pertinent items are noted in HPI.    Psychiatric Review Of Systems:  As noted in HPI.        Objective   Mental Status Exam  General Appearance: Well groomed, appropriate eye contact  Attitude/Behavior: Cooperative  Motor: No psychomotor agitation or retardation, no tremor or other abnormal movements  Speech: Normal rate, volume, prosody  Gait/Station: Other:(comment) (not observed- virtual)  Mood: \"Just feeling pretty overwhelmed.\"  Affect: Anxious, Congruent with mood and topic of conversation  Thought Process: Linear, goal directed  Thought Associations: No loosening of associations  Thought Content: Other: (comment) (depressive themes, intrusive negative thinking. Denies suicidal thinking or passive death wish today.)  Perception: Patient endorses hallucinations, but no evidence noted on exam  Sensorium: Alert and oriented to person, place, time and situation  Insight: Intact  Judgement: Intact  Cognition: Cognitively intact to conversational testing with respect to attention, orientation, fund of knowledge, recent and remote memory, and language      Vitals:  There were no vitals filed for this visit.        Cristina Richards, APRN-CNP, APRN-CNS   "

## 2024-04-15 NOTE — GROUP NOTE
Group Topic: Goals   Group Date: 4/15/2024  Start Time:  9:00 AM  End Time: 10:00 AM  Facilitators: MATTHIAS Apodaca   Department: [facility]    Number of Participants: 8   Group Focus: check in and goals  Treatment Modality: Cognitive Behavioral Therapy  Interventions utilized were assignment and support  Purpose: coping skills, feelings, and insight or knowledge    This was a video IOP group on a HIPAA compliant platform. All patients were provided with informed consent.     Date: 04/15/24, Time: 9:00-10:00 am group, Number of Patients Present: 8, User Name: [email or web address], Number of Staff: 2     Group topic(s): Weekend check-ins     On Thursday, pts. were presented with and completed the SMART goals worksheet, which included goals in the following categories: achievement, closeness to others, and enjoyment, along with anticipated obstacles and coping strategies they can utilize. Today, pts. checked in and shared about their weekends.  Facilitator: JONATAN Lawrence  Supervised by MATTHIAS Ramesh-S, ATR  Secondary Facilitator: MATTHIAS Marion    Name: Carlie Mobley YOB: 2005   MR: [id number]    Pt. feels “fragile and overwhelmed” today. Pt. shared that she had a “total mental breakdown” at the end of last week. Pt. shared that this has been stressful, and she is uncertain about the cause of the breakdown. Pt. shared that the highlight of her weekend was being able to return to her home and be in nature with animals/pets. Pt. expressed that she has found journaling to be useful and that she would like to continue to explore it to cope with her symptoms.   Pt. was present, attentive, and fully engaged during group discussion.

## 2024-04-16 ENCOUNTER — CLINICAL SUPPORT (OUTPATIENT)
Dept: BEHAVIORAL HEALTH | Facility: CLINIC | Age: 19
End: 2024-04-16
Payer: COMMERCIAL

## 2024-04-16 DIAGNOSIS — F41.1 GENERALIZED ANXIETY DISORDER: ICD-10-CM

## 2024-04-16 DIAGNOSIS — F43.10 POSTTRAUMATIC STRESS DISORDER: ICD-10-CM

## 2024-04-16 DIAGNOSIS — F33.2 SEVERE RECURRENT MAJOR DEPRESSION WITHOUT PSYCHOTIC FEATURES (MULTI): ICD-10-CM

## 2024-04-16 PROCEDURE — 90853 GROUP PSYCHOTHERAPY: CPT | Mod: U2,95

## 2024-04-17 ENCOUNTER — CLINICAL SUPPORT (OUTPATIENT)
Dept: BEHAVIORAL HEALTH | Facility: CLINIC | Age: 19
End: 2024-04-17
Payer: COMMERCIAL

## 2024-04-17 DIAGNOSIS — F33.1 MAJOR DEPRESSIVE DISORDER, RECURRENT EPISODE, MODERATE (MULTI): ICD-10-CM

## 2024-04-17 DIAGNOSIS — F43.10 POSTTRAUMATIC STRESS DISORDER: ICD-10-CM

## 2024-04-17 DIAGNOSIS — F41.1 GENERALIZED ANXIETY DISORDER: ICD-10-CM

## 2024-04-17 PROCEDURE — 90853 GROUP PSYCHOTHERAPY: CPT | Mod: U2,95

## 2024-04-17 NOTE — PROGRESS NOTES
[place]   INTENSIVE OUTPATIENT PROGRAM MULTIDISCIPLINARY  TREATMENT PLAN & PROGRESS NOTE     Patient: Carlie Mobley      : 2005  Age:  19    Student: YES  Treatment Team Date: 2024       MRN# [id number]  Nurse Practitioner: Cristina Richards NP  Date of IOP Admission: 2024     Ref by: Meghan Obregon              Week  2                     Admission Scores: ERIC 21: 37 DELIA: 34 BDI: 44  PHQ-9: 24 MAST: 0 DAST: 2 MDQ: 5     Current Risk Assessment:  Suicidal Risk:      None:  X  Ideation:       Plan:      Intent:      SI Plan with plan contracts for safety:     Hx of harming self:        Homicidal Risk:  None:  X Ideation:       Plan:      Intent:      HI Plan with means:                                     Hx of harming others:          Global Improvement    Clinical Global Impressions Rating Scale Of Illness:  5  1-No Illness Present   2-Minimal   3-Mild   4-Moderate   5-Marked X  6-Severe   7-Very Severe     Diagnoses & ICD-10 Codes  Primary Diagnosis & Code: Major Depressive Disorder; F33.1    Secondary Diagnosis & Code: Generalized Anxiety Disorder; F41.1  Tertiary Diagnosis & Code: PTSD; F43.10     Psychosocial & Environmental Stressors:   Financial  insecurity/stress   Medication  Family/Home life Difficulties   Work  School  Inadequacy of social support   Hx of trauma   Recent psychiatric inpatient discharge      Patient's Treatment Goals:            Date Initiated:            Progress Update:  2024  1.  Attend and actively participate in IOP _4_ days/week for 3 hours per day   2024  Good  X   Fair    Poor     Unclear   2.  Attend weekly medication management sessions and maintain compliance of medications  2024      Good  X  Fair     Poor     Unclear                                                        2.  Identify symptoms of diagnoses and develop coping plans    2024   Good  X  Fair     Poor     Unclear                                                 3.  Increase illness  education and symptom insight                  4/1/2024  Good  X  Fair     Poor     Unclear        Current medications:  See EMR chart        Progress note:  __New to the IOP program, attending as planned  _X_Compliant, Progressing & Improving - Needs more time in IOP therapy program   __Compliant, Progressing & Improving Planning Discharge   __Compliant, Not Progressing or Improving: Reason  __Non-Compliant, not progressing or improving: Plan  __Other:     Insurance & Benefits: MEDICAL [facility], IN NETWORK, BENEFITS ARE BASED ON MEDICAL NECESSITY ONLY: Unlimited visits, covers 80 % of the contracted rate after deductible has been met.     Co-Pay per day: $0     DEDUCTIBLE        Single: 1,250.00 Family: 2,500.00Accumulation:  Single:  401.40  Family: 401.40   CO- INSURANCE  Single:  / Family:  / Accumulation:  Single:  /  Family: /    OUT OF POCKET  Single: 3,250.00 Family: 6,500.00Accumulation: Single: 401.40  Family: 401.40         Total IOP Sessions completed & authorized to date:  5    Discharge plans have been discussed with patient & NP Cristina Richards on:   Reason for discharge:    ___Successfully completed IOP treatment:   ___Pt. decided to seek treatment elsewhere:   ___Dropped out or no show more than three times:  ___Benefits exhausted:   ___Other:    Discharge date:                     Discharge Prognosis: Excellent      Good     Fair     Poor    Follow up appointment with: Physician:   Follow up appointment with: Therapist:    Follow up Aftercare group?  Yes   Patient provided with Crisis Hotline phone number for suicide prevention? Yes     Discharge Scores: Not Completed       Treatment plan electronically signed by Treatment Team:   MATTHIAS Schaffer, ATR, CAMMIE Richards NP, SClarisse Canchola, EUGENIE [facility]

## 2024-04-17 NOTE — GROUP NOTE
Group Topic: Feeling Awareness/Expression   Group Date: 4/17/2024  Start Time: 10:00 AM  End Time: 11:00 AM  Facilitators: MATTHIAS Worley   Department: [facility]    This was a video IOP group on a HIPAA compliant platform. All patients were provided with informed consent.      Date: 4/17/2024  Time: 10:00-11:00 am group  Number of Patients Present: 8  User Name: MATTHIAS Lopez, ATR  Number of Staff: 2      Topics: Introductions/feelings check-ins, Psychoeducation on personal strengths and weaknesses       Pts first checked-in regarding their self-care intention they set in group yesterday. Next, pts engaged in a 10-minute inner strength meditation and received psychoeducation on personal strengths. Pt. was present, attentive, and fully engaged in group discussion.      Facilitator: MATTHIAS Cárdenas, ATR  Secondary Facilitator: JONATAN Lawrence    Name: Carlie WILKINSON Dean YOB: 2005   MR: [id number]

## 2024-04-17 NOTE — GROUP NOTE
Group Topic: Loss and Grief Issues   Group Date: 4/16/2024  Start Time: 10:00 AM  End Time: 11:00 AM  Facilitators: MATTHIAS Apodaca   Department: [facility]    Number of Participants: 9   Group Focus: coping skills and loss/grief issues  Treatment Modality: Psychoeducation and Spiritual  Interventions utilized were patient education, story telling, and support  Purpose: coping skills and insight or knowledge    This session was conducted on a HIPAA compliant platform.    Topic: Grief   Group shared their association with grief. Then group learned about the definition of grief and mourning, primary loss, secondary loss. Group discussed primary and secondary losses from their personal lives. Then group learned about some of the foundational grief theories ( Mariza He and Dual Process of Grief).    Facilitated by [facility]han [facility]   Secondary: JONATAN Lawrence  Supervised by MATTHIAS Ramesh-S, ATR    Name: Carlie Mobley YOB: 2005   MR: [id number]      Carlie did not participate in this group. She was on and off camera throughout group.

## 2024-04-17 NOTE — GROUP NOTE
Group Topic: Coping Skills   Group Date: 4/16/2024  Start Time:  9:00 AM  End Time: 10:00 AM  Facilitators: MATTHIAS Worley   Department: [facility]    This was a video group on a HIPAA compliant platform. All patients were provided with informed consent.      Date: 4/16/2024  Time:  9:00-10:00 am group   Number of Patients Present: 9  Number of Staff: 2  User Name: MATTHIAS Lopez ATR    Group Topic(s): Self-care intention setting/ Mind, Body, Soul Exercise       Patients first engaged in a feelings check-in and set an intention for something they planned to do after IOP for self-care, I will check-in with patients tomorrow for accountability purposes. Next, pt.'s received psychoeducation on the mind, body, and soul and how they work together to build overall wellbeing. Pt. identified ways they want to improve and maintain health and wellbeing in each area. Lastly, pt. shared and processed with the group.     Name: Carlie Mobley YOB: 2005   MR: [id number]      Carlie shared feeling withdrawn and set an intention to rest and take a nap after IOP. Pt. was present, attentive, and fully engaged throughout session.     Primary Facilitator: MATTHIAS Cárdenas, ATR  Secondary Facilitator: JONATAN Lawrence  Supervised by MATTHIAS Ramesh-S, ATR

## 2024-04-17 NOTE — GROUP NOTE
Group Topic: Coping Skills   Group Date: 4/17/2024  Start Time:  9:00 AM  End Time: 10:00 AM  Facilitators: MATTHIAS Apodaca   Department: [facility]    Number of Participants: 8   Group Focus: coping skills, daily focus, and other gratitude  Treatment Modality: Behavior Modification Therapy and Cognitive Behavioral Therapy  Interventions utilized were assignment, exploration, patient education, and story telling  Purpose: coping skills and feelings    Topic: Gratitude and Writing   This group discussed the research supporting a gratitude practice. They learned about how a practice of appreciating and focusing on thankfulness can benefit them. Then group spent 6  minutes writing about a person that has influenced them positively ( living or dead). Group watched a brief video demonstrating the activity and results of sharing the writing. Group reflected on their personal reactions to the activity.     Facilitated by Meghan Obregon [facility]   Secondary JONATAN Lawrence   Supervised by MATTHIAS Ramesh-S, ATR    Name: Carlie Mobley YOB: 2005   MR: [id number]    Patient was on and off camera. She shared in the chat to facilitator she was not in a good place today. Secondary facilitator offered/provided support.

## 2024-04-18 ENCOUNTER — DOCUMENTATION (OUTPATIENT)
Dept: BEHAVIORAL HEALTH | Facility: CLINIC | Age: 19
End: 2024-04-18
Payer: COMMERCIAL

## 2024-04-18 ENCOUNTER — APPOINTMENT (OUTPATIENT)
Dept: BEHAVIORAL HEALTH | Facility: CLINIC | Age: 19
End: 2024-04-18
Payer: COMMERCIAL

## 2024-04-18 NOTE — GROUP NOTE
Group Topic: Coping Skills   Group Date: 4/15/2024  Start Time: 11:00 AM  End Time: 12:00 PM  Facilitators: Cassia Canchola PsyD   Department: [facility]    Number of Participants: 8   Group Focus: coping skills  Treatment Modality: Cognitive Behavioral Therapy  Interventions utilized were group exercise  Purpose: other: Thought records, socratic questions, and putting thoughts on trial.    Date: 4/15/2024, Time: 11a-12p, Number of Patients: 8, Username: [email or web address], Number of Staff: 2    This session was conducted with HIPPA compliant video Zoom telehealth platform. Patient was provided with consent. Group focused on Thought Logs, Socratic Questions and Putting Thoughts on Trial. Provided psychoeducation on how to work through unhelpful thinking styles through these tools. The Group took break out time to practice, and discussed examples on ways they could use this in day to day life.     Primary Facilitator - Cassia Canchola PsyD  Secondary Facilitator - JONATAN Lawrence  Supervisor Sena Tilley, [facility], ATR    Name: Carlie Mobley YOB: 2005   MR: [id number]      Carlie used Thought challenging method - putting thoughts on trial

## 2024-04-18 NOTE — PROGRESS NOTES
[place]   INTENSIVE OUTPATIENT PROGRAM MULTIDISCIPLINARY  TREATMENT PLAN & PROGRESS NOTE     Patient: Carlie Mobley      : 2005  Age:  19    Student: YES  Treatment Team Date: 2024       MRN# [id number]  Nurse Practitioner: Cristina Richards NP  Date of IOP Admission: 2024     Ref by: Meghan Obregon              Week  2                     Admission Scores: ERIC 21: 37 DELIA: 34 BDI: 44  PHQ-9: 24 MAST: 0 DAST: 2 MDQ: 5     Current Risk Assessment:  Suicidal Risk:      None:  X  Ideation:       Plan:      Intent:      SI Plan with plan contracts for safety:     Hx of harming self:        Homicidal Risk:  None:  X Ideation:       Plan:      Intent:      HI Plan with means:                                     Hx of harming others:          Global Improvement    Clinical Global Impressions Rating Scale Of Illness:  5  1-No Illness Present   2-Minimal   3-Mild   4-Moderate   5-Marked X  6-Severe   7-Very Severe     Diagnoses & ICD-10 Codes  Primary Diagnosis & Code: Major Depressive Disorder; F33.1    Secondary Diagnosis & Code: Generalized Anxiety Disorder; F41.1  Tertiary Diagnosis & Code: PTSD; F43.10     Psychosocial & Environmental Stressors:   Financial  insecurity/stress   Medication  Family/Home life Difficulties   Work  School  Inadequacy of social support   Hx of trauma   Recent psychiatric inpatient discharge      Patient's Treatment Goals:            Date Initiated:            Progress Update:  2024  1.  Attend and actively participate in IOP _4_ days/week for 3 hours per day   2024  Good     Fair  X  Poor     Unclear   2.  Attend weekly medication management sessions and maintain compliance of medications  2024      Good  X  Fair     Poor     Unclear                                                        2.  Identify symptoms of diagnoses and develop coping plans    2024   Good  X  Fair     Poor     Unclear                                                 3.  Increase illness  education and symptom insight                  4/1/2024  Good  X  Fair     Poor     Unclear        Current medications:  See EMR chart        Progress note:  __New to the IOP program, attending as planned  _X_Compliant, Progressing & Improving - Needs more time in IOP therapy program   __Compliant, Progressing & Improving Planning Discharge   __Compliant, Not Progressing or Improving: Reason  __Non-Compliant, not progressing or improving: Plan  __Other:     Insurance & Benefits: MEDICAL [facility], IN NETWORK, BENEFITS ARE BASED ON MEDICAL NECESSITY ONLY: Unlimited visits, covers 80 % of the contracted rate after deductible has been met.     Co-Pay per day: $0     DEDUCTIBLE        Single: 1,250.00 Family: 2,500.00Accumulation:  Single:  401.40  Family: 401.40   CO- INSURANCE  Single:  / Family:  / Accumulation:  Single:  /  Family: /    OUT OF POCKET  Single: 3,250.00 Family: 6,500.00Accumulation: Single: 401.40  Family: 401.40         Total IOP Sessions completed & authorized to date:  8    Discharge plans have been discussed with patient & NP Cristina Richards on:   Reason for discharge:    ___Successfully completed IOP treatment:   ___Pt. decided to seek treatment elsewhere:   ___Dropped out or no show more than three times:  ___Benefits exhausted:   ___Other:    Discharge date:                     Discharge Prognosis: Excellent      Good     Fair     Poor    Follow up appointment with: Physician:   Follow up appointment with: Therapist:    Follow up Aftercare group?  Yes   Patient provided with Crisis Hotline phone number for suicide prevention? Yes     Discharge Scores: Not Completed       Treatment plan electronically signed by Treatment Team:   MATTHIAS Schaffer, ATR, CAMMIE Richards NP, SClarisse Canchola, EUGENIE [facility]

## 2024-04-18 NOTE — GROUP NOTE
"Group Topic: Coping Skills   Group Date: 4/15/2024  Start Time: 10:00 AM  End Time: 11:00 AM  Facilitators: Cassia Canchola PsyD   Department: [facility]    Number of Participants: 8   Group Focus: coping skills  Treatment Modality: Cognitive Behavioral Therapy  Interventions utilized were patient education  Purpose: other: Cognitive Distortions and tools to challenge: thought records, socratic questioning, putting thoughts on trial.     Date: 4/15/2024, Time: 10a-11a, Number of Patients: 8, Username: [email or web address], Number of Staff: 2    This session was conducted with HIPPA compliant video Zoom telehealth platform. Patient was provided with consent. Group focused on unhealthy thinking styles and tools to challenge. Provided psychoeducation on 10 cognitive distortions. Group members provided examples on ways they saw unhelpful thinking happening in their lives. We discussed how thoughts and feelings impact behaviors. Discussed thought logs as a tool to work on re-structuring and re-framing unhelpful thoughts.    Primary Facilitator - Cassia Canchola PsyD  Secondary Facilitator - Sarika Mcmanus, [place]  Supervisor Sena Tilley, [facility], ATR    Name: Carlie Mobley YOB: 2005   MR: [id number]      Carlie shared Unhelpful thinking style - she engages in all 10 - \"The one I struggle with the most is jumping to conclusions - I struggle with this because I have really good gut instincts but I also have really bad anxiety. Hard to differentiate between what's true and what's anxiety.\"    "

## 2024-04-22 ENCOUNTER — APPOINTMENT (OUTPATIENT)
Dept: BEHAVIORAL HEALTH | Facility: CLINIC | Age: 19
End: 2024-04-22
Payer: COMMERCIAL

## 2024-04-23 ENCOUNTER — APPOINTMENT (OUTPATIENT)
Dept: BEHAVIORAL HEALTH | Facility: CLINIC | Age: 19
End: 2024-04-23
Payer: COMMERCIAL

## 2024-04-24 ENCOUNTER — APPOINTMENT (OUTPATIENT)
Dept: BEHAVIORAL HEALTH | Facility: CLINIC | Age: 19
End: 2024-04-24
Payer: COMMERCIAL

## 2024-04-25 ENCOUNTER — APPOINTMENT (OUTPATIENT)
Dept: BEHAVIORAL HEALTH | Facility: CLINIC | Age: 19
End: 2024-04-25
Payer: COMMERCIAL

## 2024-04-29 ENCOUNTER — APPOINTMENT (OUTPATIENT)
Dept: BEHAVIORAL HEALTH | Facility: CLINIC | Age: 19
End: 2024-04-29
Payer: COMMERCIAL